# Patient Record
Sex: FEMALE | Race: WHITE | NOT HISPANIC OR LATINO | URBAN - METROPOLITAN AREA
[De-identification: names, ages, dates, MRNs, and addresses within clinical notes are randomized per-mention and may not be internally consistent; named-entity substitution may affect disease eponyms.]

---

## 2024-05-08 ENCOUNTER — INPATIENT HOSPITAL (OUTPATIENT)
Dept: URBAN - METROPOLITAN AREA HOSPITAL 98 | Facility: HOSPITAL | Age: 74
End: 2024-05-08
Payer: COMMERCIAL

## 2024-05-08 DIAGNOSIS — K92.1 MELENA: ICD-10-CM

## 2024-05-08 DIAGNOSIS — R93.3 ABNORMAL FINDINGS ON DIAGNOSTIC IMAGING OF OTHER PARTS OF DI: ICD-10-CM

## 2024-05-08 PROCEDURE — 99222 1ST HOSP IP/OBS MODERATE 55: CPT | Performed by: INTERNAL MEDICINE

## 2025-06-24 ENCOUNTER — APPOINTMENT (OUTPATIENT)
Dept: DERMATOLOGY | Facility: CLINIC | Age: 75
End: 2025-06-24
Payer: MEDICARE

## 2025-06-24 DIAGNOSIS — C21.0: Primary | ICD-10-CM

## 2025-06-24 PROCEDURE — 1159F MED LIST DOCD IN RCRD: CPT | Performed by: STUDENT IN AN ORGANIZED HEALTH CARE EDUCATION/TRAINING PROGRAM

## 2025-06-24 PROCEDURE — 99204 OFFICE O/P NEW MOD 45 MIN: CPT | Performed by: STUDENT IN AN ORGANIZED HEALTH CARE EDUCATION/TRAINING PROGRAM

## 2025-06-24 RX ORDER — SEMAGLUTIDE 0.68 MG/ML
INJECTION, SOLUTION SUBCUTANEOUS
COMMUNITY
Start: 2025-02-04

## 2025-06-24 RX ORDER — CLOPIDOGREL BISULFATE 75 MG/1
75 TABLET ORAL DAILY
COMMUNITY
Start: 2024-06-10

## 2025-06-24 RX ORDER — LANCETS
EACH MISCELLANEOUS
COMMUNITY

## 2025-06-24 RX ORDER — METHYLPREDNISOLONE 4 MG/1
TABLET ORAL
COMMUNITY
Start: 2025-05-06

## 2025-06-24 RX ORDER — ALBUTEROL SULFATE 90 UG/1
2 INHALANT RESPIRATORY (INHALATION) EVERY 4 HOURS PRN
COMMUNITY
Start: 2024-11-04

## 2025-06-24 RX ORDER — METOPROLOL SUCCINATE 200 MG/1
200 TABLET, EXTENDED RELEASE ORAL DAILY
COMMUNITY

## 2025-06-24 RX ORDER — PANTOPRAZOLE SODIUM 40 MG/1
40 TABLET, DELAYED RELEASE ORAL
COMMUNITY

## 2025-06-24 RX ORDER — ALIROCUMAB 75 MG/ML
INJECTION, SOLUTION SUBCUTANEOUS
COMMUNITY
Start: 2024-09-10

## 2025-06-24 RX ORDER — FLUTICASONE PROPIONATE 50 MCG
SPRAY, SUSPENSION (ML) NASAL
COMMUNITY

## 2025-06-24 RX ORDER — ATORVASTATIN CALCIUM 80 MG/1
80 TABLET, FILM COATED ORAL DAILY
COMMUNITY
Start: 2024-04-17

## 2025-06-24 RX ORDER — SPIRONOLACTONE 50 MG/1
50 TABLET, FILM COATED ORAL DAILY
COMMUNITY
Start: 2024-06-03

## 2025-06-24 RX ORDER — FLASH GLUCOSE SENSOR
KIT MISCELLANEOUS
COMMUNITY

## 2025-06-24 RX ORDER — LORATADINE 10 MG/1
10 TABLET ORAL
COMMUNITY

## 2025-06-24 RX ORDER — LISINOPRIL 40 MG/1
40 TABLET ORAL DAILY
COMMUNITY
Start: 2024-06-10

## 2025-06-24 RX ORDER — INSULIN LISPRO 100 [IU]/ML
INJECTION, SOLUTION INTRAVENOUS; SUBCUTANEOUS
COMMUNITY
Start: 2025-05-19

## 2025-06-24 NOTE — Clinical Note
>12 cm x 5 cm pink plaque predominantly perianal but also extending to the vulva. No inguinal lymphadenopathy.

## 2025-06-24 NOTE — PROGRESS NOTES
Office Visit Note  Date: 6/24/2025  Surgeon:  Kal Morales MD  Office Location: [facility] [place] [id number] [address] [address]  Dept: [phone]  Dept Fax: [phone]  Referring Provider: Sunshine Dawson PA-C  [facility],  [place] [id number] [place] [address]   Goldie Crystal is a 74 y.o. female who presents for the following: Consult for extramammary Paget disease (EMPD).     According to the patient, the lesion has been present for approximately two years prior to diagnosis. It is only in the buttock area and she denies rashes in her armpits or groin. The lesion was biopsied on 6/2/25 and pathology was consistent with EMPD.  The lesion is bleeding and painful.  The lesion has not been treated previously.    Regarding the patient's oncologic history, she believes she had a malignant mass removed from her left kidney and she is being monitored for another mass on her right kidney. She has had three colonoscopies in the past which required polyp removal twice. She is past due for her most recent colonoscopy 2 years ago due to a stroke. She states that when she suffered her stroke, a colonoscopy was deemed too high risk as she was on blood thinners. She currently takes Plavix. She also had a hysterectomy 27 years ago for uterine fibroids.      The patient does not have a pacemaker / defibrillator.  The patient does not have a heart valve / joint replacement.    The patient is on blood thinners.  The patient does not have a history of hepatitis B or C.  The patient does not have a history of HIV.  The patient does not have a history of immunosuppression (e.g. organ transplantation, malignancy, medications)    Review of Systems:  No fever, chills, unintentional weight loss, chronic cough, abdominal pain, bloody stool or urine.     MEDICAL HISTORY: clinically relevant history including significant past medical history, medications and allergies was reviewed and documented in  Epic.    Objective   Well appearing patient in no apparent distress; mood and affect are within normal limits.  Vital signs: See record.  Noted on the   Gluteal Cleft  >12 cm x 5 cm pink plaque predominantly perianal but also extending to the vulva. No inguinal lymphadenopathy.     The patient confirmed the identified site.    Discussion:  The nature of the diagnosis was explained. The lesion is a rare skin cancer. It was discussed with the patient that EMPD can be primary (isolated) or secondary (associated with an internal malignancy). Perinanal EMPD in particular is associated with colon cancer. The patient understands this. She is overdue for a colonoscopy (skipped last screening due to stroke) and has a history of polyps. I discussed with the patient that we will present her case at tumor board to determine recommendations for screening for internal malignancy.    Regarding treatment for the cutaneous EMPD itself, I discussed the management options including surgery, radiotherapy and topical treatments such as imiquimod. Surgical resection is first line but her disease is extensive. Unfortunately, given the proximity of her EMPD to the anus and vulva, resection will likely require involvement of colorectal surgery and gynecologic oncology. It was explained that recurrence of EMPD is high even after surgery (studies suggest 10-30%). Risks of surgery including but not limited to bleeding, infection, numbness, nerve damage, and scar were reviewed.  Discussion included wound care requirements, activity restrictions, likely scar outcome and time to heal. Non-surgical options including radiotherapy and chemotherapy are also available. We will follow up with tumor board recommendations.     -Follow up tumor board recommendations for screening and treatment  -If surgery is recommended, will likely need coordination with colorectal surgery and gynecologic oncology. Can consider scouting biopsies to assess extent of  disease.     The patient will follow up pending tumor board recommendations

## 2025-06-30 ENCOUNTER — TUMOR BOARD CONFERENCE (OUTPATIENT)
Dept: HEMATOLOGY/ONCOLOGY | Facility: HOSPITAL | Age: 75
End: 2025-06-30
Payer: MEDICARE

## 2025-06-30 DIAGNOSIS — C44.99 EXTRA-MAMMARY PAGETS DISEASE: ICD-10-CM

## 2025-06-30 NOTE — TUMOR BOARD NOTE
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    Recommendation:     Screening: Referral to GI for discussion of colonoscopy. Consider urine cytology and CT CAP, and referral to gynecologic oncology for additional testing.  Reference: Marissa N, Mario VILLALOBOS, Paul B, Brian DE LA CRUZ; Extramammary Paget's Disease Guideline Study Group. Recommended guidelines for screening for underlying malignancy in extramammary Paget's disease based on anatomic subtype. J Am Acad Dermatol. 2025 Feb;92(2):261-268. doi: 10.1016/j.jaad.2024.07.1531. Epub 2024 Oct 12. PMID: [id number].\"     Treatment: Given the proximity of her EMPD to the anus and vulva, recommend referral to colorectal surgery and gynecologic oncology for consideration of resection.      Review Multidisciplinary Cutaneous Oncology Conference recommendation with patient.  Continue routine follow up and total body skin exams with Sunshine Dawson.    Follow Up:  Sunshine Dawson, colorectal surgery, gynecologic surgery      History and Physical Exam  Dermatologic History:   74 y.o. female with a 12 X 5 cm pink plaque predominantly perianal but also extending to the vulva. This was first noticed approximately 2 years ago. She underwent  a biopsy of the gluteal cleft on 6/2/2025 showing a Extramammary Paget's disease.    PMH:    Essential hypertension, benign      GERD (gastroesophageal reflux disease)      Left kidney mass       DX 4/2019    Long term current use of insulin (HCC)      Mixed hyperlipidemia      Obesity (BMI 30.0-34.9)      Palpitations      Restless leg      Sleep apnea       with CPAP     Stroke, Ischemic- Left Parietal 06/2023    Type 2 diabetes mellitus with hyperglycemia (HCC)  "             Pathology  Results A. Gluteal Cleft, Biopsy by Shave Method CONSISTENT WITH EXTRAMAMMARY PAGET' S DISEASE COMMENT: Additional clinical work- up is recommended to determine the primary site of this carcinoma.

## 2025-07-09 ENCOUNTER — APPOINTMENT (OUTPATIENT)
Dept: SURGERY | Facility: CLINIC | Age: 75
End: 2025-07-09
Payer: MEDICARE

## 2025-07-10 ENCOUNTER — TELEPHONE (OUTPATIENT)
Dept: DERMATOLOGY | Facility: CLINIC | Age: 75
End: 2025-07-10
Payer: MEDICARE

## 2025-07-10 DIAGNOSIS — C21.0: Primary | ICD-10-CM

## 2025-07-10 RX ORDER — FAMOTIDINE 20 MG/1
TABLET, FILM COATED ORAL
Qty: 1 TABLET | Refills: 0 | Status: CANCELLED | OUTPATIENT
Start: 2025-07-10

## 2025-07-10 RX ORDER — PREDNISONE 50 MG/1
TABLET ORAL
Qty: 3 TABLET | Refills: 0 | Status: SHIPPED | OUTPATIENT
Start: 2025-07-10

## 2025-07-10 NOTE — TELEPHONE ENCOUNTER
Called patient to discuss imaging screening for internal malignancy associated with extramammary Paget disease. Because contrast with her CT scans of the chest, abdomen and pelvis would be helpful to identify internal malignancies, I discussed with her the allergy to contrast. She stated that her allergic reaction occurred when she was 23 and she developed coughing, sneezing and some wheezing when she received contrast, which improved with benadryl. She did not require an Epi pen. She also tolerated a subsequent CT with contrast in 2019 after premedication with benadryl. I discussed her case with radiology who suggested a 13 hour prep protocol including prednisone, benadryl and pepcid prior to receiving contrast. The patient was agreeable to undergoing the prep in order to get the contrast. She was verbally provided with the protocol and a prescription for prednisone was sent to her pharmacy. The patient will schedule the CT scans.

## 2025-07-21 NOTE — PROGRESS NOTES
Gynecologic Oncology Initial Consultation  [facility]    Patient ID: Goldie Crystal, 74 y.o.  Referring Physician: Kal Morales MD  [address] [place] [address]  Primary Care Provider: ARVIN Winston-CNP      Reason for Consultation: paget disease  Subjective    HPI 75 y/o F here in consultation for extramammary paget disease. She reports perianal lesion 2 years ago. Initial thought was bed sores. Was evaluated by a couple of providers. Deferred biopsy. Treatment with topical steroids with no relief. With ongoing symptoms she was referred to wound care. Subsequently was seen by dermatology. A biopsy was performed on 6/20/25 consistent with extramamammary paget disease CK20, CK7 and EMA +. She was referred for further management. Planning to see Dr Arriaga upcoming.     Report of ongoing pruritus. Worse just lateral to the perineum and surrounding the anus. Denies pain. Does report constipation with a history of gastroparesis, etiology unknown.     A comprehensive review of systems was performed and otherwise negative.    Objective      Medical History[1]     Surgical History[2]     Family History[3]  Otherwise, denies history of endometrial, ovarian, breast, prostate, or colorectal cancers.    OBGYN Hx:  - Last Pap unsure, denies history of abnormal    Screening:  - Last Mammogram: UTD, last 2025 normal  - Last Colonoscopy: Deferred, was encouraged to discuss with Dr Bart Del Cid Hx:  Goldie Crystal  has no history on file for tobacco use.  She  has no history on file for alcohol use.  She  has no history on file for drug use.    Physical Exam  Vitals and nursing note reviewed. Exam conducted with a chaperone present.   Constitutional:       General: She is not in acute distress.     Appearance: Normal appearance.   HENT:      Head: Normocephalic and atraumatic.      Mouth/Throat:      Mouth: Mucous membranes are moist.      Pharynx: No oropharyngeal exudate or posterior oropharyngeal erythema.      Eyes:      Extraocular Movements: Extraocular movements intact.      Conjunctiva/sclera: Conjunctivae normal.      Pupils: Pupils are equal, round, and reactive to light.     Neck:      Thyroid: No thyroid mass or thyromegaly.     Cardiovascular:      Rate and Rhythm: Normal rate and regular rhythm.      Pulses: Normal pulses.      Heart sounds: Normal heart sounds.   Pulmonary:      Effort: Pulmonary effort is normal.      Breath sounds: Normal breath sounds. No wheezing, rhonchi or rales.   Abdominal:      General: Bowel sounds are normal. There is no distension.      Palpations: Abdomen is soft. There is no mass.      Tenderness: There is no abdominal tenderness.      Hernia: No hernia is present.   Genitourinary:       Comments: Plaque with cupcake appearance posterior to the labia majora and lateral to the perineal body extending along the anus measuring approximate 8 x 4 cm.    Musculoskeletal:         General: No tenderness. Normal range of motion.      Cervical back: Normal range of motion and neck supple. No tenderness.      Right lower leg: No edema.      Left lower leg: No edema.     Skin:     General: Skin is warm.      Findings: No lesion or rash.           Comments: Second lesion posterior, just cephalad to the anus measuring approximately 4 x 5 cm     Neurological:      General: No focal deficit present.      Mental Status: She is alert and oriented to person, place, and time.     Psychiatric:         Mood and Affect: Mood normal.         Behavior: Behavior normal.       BSA: There is no height or weight on file to calculate BSA.  There were no vitals taken for this visit.  General:   alert and oriented, in no acute distress   Heart: regular rate and rhythm, S1, S2 normal, no murmur, click, rub or gallop   Lungs: clear to auscultation bilaterally   Abdomen: soft, non-tender, without masses or organomegaly     Pathology:  See above    Imaging:  CT 8/17/24  IMPRESSION:   No acute findings to explain  patient's symptoms.   Other stable chronic and incidental findings as above.       Performance Status:  Symptomatic; fully ambulatory    Assessment/Plan    74 y.o. with extramammary paget disease    # extramammary paget disease  - Reviewed paget disease is an intraepithelial adenocarcinoma however invasive adenocarcinomas may be present within or beneath the surface lesion in up to 25 percent and 20 to 30 percent of patients will have a noncontiguous carcinoma   - Discussed treatment typically consists of wide local excision or vulvectomy, depending on the extent of disease, though more conservative surgery with < 2 cm margins can be considered  - Can consider alternatives to surgery with either definitive RT, cytotoxic chemotherapy, and topical imiquimod though data is limited on benefit   - Counseled on risk of surgery and likely need for reconstruction with plastic surgery   - Given distribution of disease involving mostly the perianal region will discuss surgical resectability with colorectal surgery   - Discussed high risk of recurrence even with negative margins    # pruritus   - Atarax 25 TID PRN sent to the pharmacy    RTC 2-3 weeks    Mona Barboza MD MPH                      [1] No past medical history on file.  [2] No past surgical history on file.  [3] No family history on file.

## 2025-07-23 ENCOUNTER — OFFICE VISIT (OUTPATIENT)
Dept: GYNECOLOGIC ONCOLOGY | Facility: CLINIC | Age: 75
End: 2025-07-23
Payer: MEDICARE

## 2025-07-23 VITALS
SYSTOLIC BLOOD PRESSURE: 126 MMHG | HEART RATE: 79 BPM | TEMPERATURE: 97.5 F | RESPIRATION RATE: 17 BRPM | OXYGEN SATURATION: 93 % | WEIGHT: 172.4 LBS | DIASTOLIC BLOOD PRESSURE: 65 MMHG | BODY MASS INDEX: 34.76 KG/M2 | HEIGHT: 59 IN

## 2025-07-23 DIAGNOSIS — L29.9 PRURITUS: ICD-10-CM

## 2025-07-23 DIAGNOSIS — C44.99 EXTRAMAMMARY PAGET DISEASE: Primary | ICD-10-CM

## 2025-07-23 PROCEDURE — 1126F AMNT PAIN NOTED NONE PRSNT: CPT | Performed by: STUDENT IN AN ORGANIZED HEALTH CARE EDUCATION/TRAINING PROGRAM

## 2025-07-23 PROCEDURE — 99205 OFFICE O/P NEW HI 60 MIN: CPT | Performed by: STUDENT IN AN ORGANIZED HEALTH CARE EDUCATION/TRAINING PROGRAM

## 2025-07-23 PROCEDURE — 3008F BODY MASS INDEX DOCD: CPT | Performed by: STUDENT IN AN ORGANIZED HEALTH CARE EDUCATION/TRAINING PROGRAM

## 2025-07-23 PROCEDURE — 99215 OFFICE O/P EST HI 40 MIN: CPT | Performed by: STUDENT IN AN ORGANIZED HEALTH CARE EDUCATION/TRAINING PROGRAM

## 2025-07-23 ASSESSMENT — COLUMBIA-SUICIDE SEVERITY RATING SCALE - C-SSRS
6. HAVE YOU EVER DONE ANYTHING, STARTED TO DO ANYTHING, OR PREPARED TO DO ANYTHING TO END YOUR LIFE?: NO
2. HAVE YOU ACTUALLY HAD ANY THOUGHTS OF KILLING YOURSELF?: NO
1. IN THE PAST MONTH, HAVE YOU WISHED YOU WERE DEAD OR WISHED YOU COULD GO TO SLEEP AND NOT WAKE UP?: NO

## 2025-07-23 ASSESSMENT — PATIENT HEALTH QUESTIONNAIRE - PHQ9
1. LITTLE INTEREST OR PLEASURE IN DOING THINGS: NOT AT ALL
2. FEELING DOWN, DEPRESSED OR HOPELESS: NOT AT ALL
SUM OF ALL RESPONSES TO PHQ9 QUESTIONS 1 AND 2: 0

## 2025-07-23 ASSESSMENT — PAIN SCALES - GENERAL: PAINLEVEL_OUTOF10: 0-NO PAIN

## 2025-07-23 ASSESSMENT — ENCOUNTER SYMPTOMS
OCCASIONAL FEELINGS OF UNSTEADINESS: 0
LOSS OF SENSATION IN FEET: 0
DEPRESSION: 0

## 2025-07-23 NOTE — LETTER
July 24, 2025     Kal Morales MD  [address] [address]    Patient: Goldie Crystal   YOB: 1950   Date of Visit: 7/23/2025       Dear Dr. Kal Morales MD:    Thank you for referring Goldie Crystal to me for evaluation. Below are my notes for this consultation.  If you have questions, please do not hesitate to call me. I look forward to following your patient along with you.       Sincerely,     Mona Barboza MD MPH      CC: Carter Arriaga MD  ______________________________________________________________________________________      Gynecologic Oncology Initial Consultation  [place]    Patient ID: Goldie Crystal, 74 y.o.  Referring Physician: Kal Morales MD  [address] [place] [id number]  Primary Care Provider: ARVIN Winston-CNP      Reason for Consultation: paget disease  Subjective   HPI 75 y/o F here in consultation for extramammary paget disease. She reports perianal lesion 2 years ago. Initial thought was bed sores. Was evaluated by a couple of providers. Deferred biopsy. Treatment with topical steroids with no relief. With ongoing symptoms she was referred to wound care. Subsequently was seen by dermatology. A biopsy was performed on 6/20/25 consistent with extramamammary paget disease CK20, CK7 and EMA +. She was referred for further management. Planning to see Dr Arriaga upcoming.     Report of ongoing pruritus. Worse just lateral to the perineum and surrounding the anus. Denies pain. Does report constipation with a history of gastroparesis, etiology unknown.     A comprehensive review of systems was performed and otherwise negative.    Objective     Medical History[1]     Surgical History[2]     Family History[3]  Otherwise, denies history of endometrial, ovarian, breast, prostate, or colorectal cancers.    OBGYN Hx:  - Last Pap unsure, denies history of abnormal    Screening:  - Last Mammogram: UTD, last 2025 normal  - Last Colonoscopy: Deferred, was  encouraged to discuss with Dr Arriaga    Social Hx:  Goldie Crystal  has no history on file for tobacco use.  She  has no history on file for alcohol use.  She  has no history on file for drug use.    Physical Exam  Vitals and nursing note reviewed. Exam conducted with a chaperone present.   Constitutional:       General: She is not in acute distress.     Appearance: Normal appearance.   HENT:      Head: Normocephalic and atraumatic.      Mouth/Throat:      Mouth: Mucous membranes are moist.      Pharynx: No oropharyngeal exudate or posterior oropharyngeal erythema.     Eyes:      Extraocular Movements: Extraocular movements intact.      Conjunctiva/sclera: Conjunctivae normal.      Pupils: Pupils are equal, round, and reactive to light.     Neck:      Thyroid: No thyroid mass or thyromegaly.     Cardiovascular:      Rate and Rhythm: Normal rate and regular rhythm.      Pulses: Normal pulses.      Heart sounds: Normal heart sounds.   Pulmonary:      Effort: Pulmonary effort is normal.      Breath sounds: Normal breath sounds. No wheezing, rhonchi or rales.   Abdominal:      General: Bowel sounds are normal. There is no distension.      Palpations: Abdomen is soft. There is no mass.      Tenderness: There is no abdominal tenderness.      Hernia: No hernia is present.   Genitourinary:       Comments: Plaque with cupcake appearance posterior to the labia majora and lateral to the perineal body extending along the anus measuring approximate 8 x 4 cm.    Musculoskeletal:         General: No tenderness. Normal range of motion.      Cervical back: Normal range of motion and neck supple. No tenderness.      Right lower leg: No edema.      Left lower leg: No edema.     Skin:     General: Skin is warm.      Findings: No lesion or rash.           Comments: Second lesion posterior, just cephalad to the anus measuring approximately 4 x 5 cm     Neurological:      General: No focal deficit present.      Mental Status: She is alert and  oriented to person, place, and time.     Psychiatric:         Mood and Affect: Mood normal.         Behavior: Behavior normal.       BSA: There is no height or weight on file to calculate BSA.  There were no vitals taken for this visit.  General:   alert and oriented, in no acute distress   Heart: regular rate and rhythm, S1, S2 normal, no murmur, click, rub or gallop   Lungs: clear to auscultation bilaterally   Abdomen: soft, non-tender, without masses or organomegaly     Pathology:  See above    Imaging:  CT 8/17/24  IMPRESSION:   No acute findings to explain patient's symptoms.   Other stable chronic and incidental findings as above.       Performance Status:  Symptomatic; fully ambulatory    Assessment/Plan   74 y.o. with extramammary paget disease    # extramammary paget disease  - Reviewed paget disease is an intraepithelial adenocarcinoma however invasive adenocarcinomas may be present within or beneath the surface lesion in up to 25 percent and 20 to 30 percent of patients will have a noncontiguous carcinoma   - Discussed treatment typically consists of wide local excision or vulvectomy, depending on the extent of disease, though more conservative surgery with < 2 cm margins can be considered  - Can consider alternatives to surgery with either definitive RT, cytotoxic chemotherapy, and topical imiquimod though data is limited on benefit   - Counseled on risk of surgery and likely need for reconstruction with plastic surgery   - Given distribution of disease involving mostly the perianal region will discuss surgical resectability with colorectal surgery   - Discussed high risk of recurrence even with negative margins    # pruritus   - Atarax 25 TID PRN sent to the pharmacy    RTC 2-3 weeks    Mona Barboza MD MPH                      [1]  No past medical history on file.  [2]  No past surgical history on file.  [3]  No family history on file.       [1]  No past medical history on file.  [2]  No past surgical  history on file.  [3]  No family history on file.       [1]  No past medical history on file.  [2]  No past surgical history on file.  [3]  No family history on file.

## 2025-07-24 DIAGNOSIS — Z15.89 BIALLELIC MUTATION OF C21ORF59 GENE: Primary | ICD-10-CM

## 2025-07-24 RX ORDER — HYDROXYZINE HYDROCHLORIDE 10 MG/1
25 TABLET, FILM COATED ORAL EVERY 8 HOURS PRN
Qty: 30 TABLET | Refills: 0 | Status: SHIPPED | OUTPATIENT
Start: 2025-07-24 | End: 2025-08-03

## 2025-07-25 ENCOUNTER — HOSPITAL ENCOUNTER (OUTPATIENT)
Dept: RADIOLOGY | Facility: HOSPITAL | Age: 75
Discharge: HOME | End: 2025-07-25
Payer: MEDICARE

## 2025-07-25 DIAGNOSIS — C21.0: ICD-10-CM

## 2025-07-25 DIAGNOSIS — Z15.89 BIALLELIC MUTATION OF C21ORF59 GENE: ICD-10-CM

## 2025-07-25 LAB
CREAT SERPL-MCNC: 0.96 MG/DL (ref 0.6–1)
EGFRCR SERPLBLD CKD-EPI 2021: 62 ML/MIN/1.73M2

## 2025-07-25 PROCEDURE — 71260 CT THORAX DX C+: CPT

## 2025-07-25 PROCEDURE — 2550000001 HC RX 255 CONTRASTS: Performed by: STUDENT IN AN ORGANIZED HEALTH CARE EDUCATION/TRAINING PROGRAM

## 2025-07-25 RX ADMIN — IOHEXOL 75 ML: 350 INJECTION, SOLUTION INTRAVENOUS at 14:23

## 2025-08-04 ENCOUNTER — TELEPHONE (OUTPATIENT)
Dept: DERMATOLOGY | Facility: CLINIC | Age: 75
End: 2025-08-04

## 2025-08-04 ENCOUNTER — TUMOR BOARD CONFERENCE (OUTPATIENT)
Dept: HEMATOLOGY/ONCOLOGY | Facility: HOSPITAL | Age: 75
End: 2025-08-04
Payer: MEDICARE

## 2025-08-04 DIAGNOSIS — C21.0: Primary | ICD-10-CM

## 2025-08-04 NOTE — TUMOR BOARD NOTE
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    S/p CT CAP revealing indeterminate renal lesions given single-phase technique. Additionally, there is a indeterminate 1.3 cm right adrenal lesion.    Referred to TB for imaging review.    Recommendation: Consider kidney specific MRI. Follow-up with colorectal surgery to discuss surgical resectability.     Review Multidisciplinary Cutaneous Oncology Conference recommendation with patient.  Continue routine follow up and total body skin exams with Sunshine Dawson.    Follow Up:  Sunshine Dawson, colorectal surgery, gynecologic surgery      History and Physical Exam  Dermatologic History:   74 y.o. female with a 12 X 5 cm pink plaque predominantly perianal but also extending to the vulva. This was first noticed approximately 2 years ago. She underwent  a biopsy of the gluteal cleft on 6/2/2025 showing a Extramammary Paget's disease.    Seen by gynecologic oncology and discussed treatment options.    S/p CT CAP on 7/25/2025 revealing indeterminate renal lesions given single-phase technique. Additionally, there is a indeterminate 1.3 cm right adrenal lesion.    PMH:    Essential hypertension, benign      GERD (gastroesophageal reflux disease)      Left kidney mass       DX 4/2019    Long term current use of insulin (HCC)      Mixed hyperlipidemia      Obesity (BMI 30.0-34.9)      Palpitations      Restless leg      Sleep apnea       with CPAP     Stroke, Ischemic- Left Parietal 06/2023    Type 2 diabetes mellitus with hyperglycemia (HCC)              Pathology  Results A. Gluteal Cleft, Biopsy by Shave Method CONSISTENT WITH EXTRAMAMMARY PAGET' S DISEASE COMMENT: Additional clinical work- up is  recommended to determine the primary site of this carcinoma.    Radiology  CT CHEST ABDOMEN PELVIS W IV CONTRAST;    7/25/2025 2:28 pm      INDICATION:  Signs/Symptoms:Patient with extramammary Paget disease, please  evaluate for internal malignancy (gautam. colon adenocarcinoma). Patient  will undergo 13 hour prep given previous allergy.      COMPARISON:  None.      ACCESSION NUMBER(S):  [id number]      ORDERING CLINICIAN:  VALDO SZYMANSKI      TECHNIQUE:  Contiguous axial images of the chest, abdomen, and pelvis were  obtained after the intravenous administration of 75 mL Omnipaque 350  contrast. Coronal and sagittal reformatted images were reconstructed  from the axial data.      FINDINGS:  CT CHEST:      MEDIASTINUM AND LYMPH NODES:  No enlarged intrathoracic or axillary  lymph nodes by imaging criteria. The thyroid gland is unremarkable.  The esophagus appears within normal limits. No pneumomediastinum.      VESSELS: Normal caliber of the pulmonary vessels without large  proximal pulmonary emboli within limitations of non angiographic  study. Normal caliber aorta without dissection. Mild aortic  atherosclerosis.      HEART: Normal size. Prominent aortic annulus calcifications noted.  Mild coronary artery calcifications. No significant pericardial  effusion.      LUNG, AIRWAYS, PLEURA: Large airways are patent.  Right-greater-than-left basilar scarring noted. Prominent pericardial  fat pad results in mild volume loss and component of scarring within  the lingula and right middle lobe. No focal consolidation, pleural  effusion, pneumothorax. No suspicious discrete nodularity identified.      CHEST WALL SOFT TISSUES: No discernible acute abnormality.      OSSEOUS STRUCTURES: No acute osseous abnormality.          CT ABDOMEN/PELVIS:      ABDOMINAL WALL: Postsurgical changes of the anterior abdominal wall  noted. Fat stranding small amount of skin thickening is noted  overlying the anterior abdominal wall (series 2, image  148) which may  relate to patient's known history of extramedullary Paget's disease.  There is a small fat containing ventral hernia within ostium of 2 cm  (series 2, image 144).      LIVER: No significant parenchymal abnormality.      BILE DUCTS: No significant intrahepatic or extrahepatic dilatation.      GALLBLADDER: No significant abnormality.      PANCREAS: No significant abnormality.      SPLEEN: No significant abnormality.      ADRENALS: A 1.3 cm hypodense right adrenal lesion is present. The  adrenals are otherwise unremarkable.      KIDNEYS, URETERS, BLADDER: Lobulated contour of the bilateral  kidneys. Multifocal cortically based lesions noted most of which are  low internal density most likely representing simple renal cysts.  However, within the superior pole left kidney, there is a 1.5 cm  cortically based hyperdense rounded lesion which is high in internal  density with surrounding prominent vasculature (series 2, image 96  and 98). Additional indeterminate density cortically based lesion  noted within the superior pole left kidney (series 2, image 74) which  measures 1.0 cm. The bladder is unremarkable.      REPRODUCTIVE ORGANS: Foci of irregular calcification noted within the  right ovary which measures 2.2 x 1.5 cm (series 2, image 144). The  left ovary is unremarkable. No adnexal mass is evident.      VESSELS: Moderate calcification of the abdominal aorta. The IVC and  portal veins are patent. 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      RETROPERITONEUM/LYMPH NODES: No mesenteric or retroperitoneal  lymphadenopathy by CT size criteria. No acute retroperitoneal  abnormality.      BOWEL/MESENTERY/PERITONEUM: The stomach is unremarkable. There are  multiple small duodenal diverticula (series 2, image 105). Otherwise,  the small bowel is of normal caliber without significant bowel wall  thickening or dilation. Significant diverticulosis of the  descending  and sigmoid colon noted with mild thickening of the rectosigmoid  colon likely sequela of chronic inflammation. No masslike thickening  identified. No evidence to suggest acute diverticulitis. The appendix  is not definitively visualized.      No ascites, free air, or fluid collection.          MUSCULOSKELETAL: No suspicious osseous lesion identified. Compression  deformities identified of L1 which is of indeterminate chronicity  although, favored to be chronic in nature given lack of surrounding  inflammatory changes. There is accentuation of the thoracic kyphosis  and lumbar lordosis secondary to degenerative changes. There is grade  1 anterolisthesis of L4 on L5 and grade 1 retrolisthesis of L1 on L2.      IMPRESSION:  1. Indeterminate renal lesions given single-phase technique.  Additionally, there is a indeterminate 1.3 cm right adrenal lesion.  Further characterization of these findings by multiphase renal MRI  with and without contrast is recommended.  2. Additional chronic and incidental findings as described above.

## 2025-08-04 NOTE — TELEPHONE ENCOUNTER
Patient called and left a voicemail stating she has been in contact with Dr Morales regarding potential upcoming treatment but has not yet had surgery yet.  Today she is having flank pain and is concerned she may have a kidney stone; she wanted to know if she should be seen at a  facility for evaluation and that there is not a urologist close to where she lives.  Attempted to reach patient; voice mailbox is full and there was no answer at the home phone number.  Called patient's daughter and left a voicemail (per comm form) explaining the situation and letting her know her mother should be evaluated for her pain wherever she is able to be seen and that Dr Morales would not be involved for this issue but for her dermatology care only.  Dr Morales aware of the voicemail left by the patient.

## 2025-08-05 ENCOUNTER — SOCIAL WORK (OUTPATIENT)
Dept: HEMATOLOGY/ONCOLOGY | Facility: CLINIC | Age: 75
End: 2025-08-05
Payer: MEDICARE

## 2025-08-05 NOTE — PROGRESS NOTES
Patient was recently seen as a new patient with Dr. Barboza- for consultation of paget disease. Patient identified a distress score of 6 at the visit with contributing factors as : physical concerns (fatigue, memory or concentration); emotional concerns (sadness or depression, loneliness); social concerns (relationship with children); practical concerns (finances)- LSW attempted to reach patient but she did not answer and voice mail box was unable to accept messages at this time.  LSW will attempt at later time. The patient was provided with the below letter along with information on how a  can help, patient was also provided with my contact information. No current social work needs have been identified at the moment but social work remains available should any psychosocial needs arise.      July 31, 2025    Dear Goldie,      I hope this letter finds you well. As part of [facility]'s commitment to providing comprehensive care, I wanted to introduce myself as the social work on your care team. Our team goal is to support you and your loved ones throughout your care here, addressing not only your medical needs but also your emotional, practical, and social concerns.    My name is Estephanie Montilla and I am a licensed social worker working within Dr. Barboza's office.  I am available to assist you in a variety of ways, including:  Emotional Support: Offering a safe space to discuss your feelings and coping strategies.  Resource Navigation: Connecting you with financial assistance programs, local transportation resources, and other community resources.  Family Support: Helping your loved ones understand and navigate the challenges of your diagnosis.  Advanced Care Planning: Assisting with decisions about care preferences and advance directives.    These services are provided at no additional cost to you as part of your care at [facility]. I  encourage you to reach out and take advantage of this support whenever needed.    If you have questions or if you would like to set up a time to talk, please feel free to reach out to me at [phone] or email me at [email or web address]    Sincerely,      BRIAN Mendoza, [facility]  Oncology Social Work  [facility]  [phone]

## 2025-08-08 ENCOUNTER — TELEPHONE (OUTPATIENT)
Dept: DERMATOLOGY | Facility: CLINIC | Age: 75
End: 2025-08-08
Payer: MEDICARE

## 2025-08-08 NOTE — TELEPHONE ENCOUNTER
Patient called and left voicemail regarding her urologist wanting to order an MRI and wanting to know if Dr Morales was already ordering an MRI prior to her surgical procedure, that her urologist said Dr Morales's order for the MRI would be fine if so.      Called patient back to discuss per Dr Morales; voice mailbox full so unable to leave a message.  No answering machine to leave message on house phone number and pt had already left work for the day.  Dr Morales aware and will reach out to patient himself.

## 2025-08-13 ENCOUNTER — OFFICE VISIT (OUTPATIENT)
Dept: SURGERY | Facility: CLINIC | Age: 75
End: 2025-08-13
Payer: MEDICARE

## 2025-08-13 VITALS
HEART RATE: 82 BPM | TEMPERATURE: 98.1 F | SYSTOLIC BLOOD PRESSURE: 115 MMHG | DIASTOLIC BLOOD PRESSURE: 74 MMHG | BODY MASS INDEX: 35.48 KG/M2 | HEIGHT: 59 IN | WEIGHT: 176 LBS

## 2025-08-13 DIAGNOSIS — C44.99 EXTRA-MAMMARY PAGETS DISEASE: ICD-10-CM

## 2025-08-13 PROCEDURE — 99205 OFFICE O/P NEW HI 60 MIN: CPT | Performed by: STUDENT IN AN ORGANIZED HEALTH CARE EDUCATION/TRAINING PROGRAM

## 2025-08-13 PROCEDURE — 46600 DIAGNOSTIC ANOSCOPY SPX: CPT | Performed by: STUDENT IN AN ORGANIZED HEALTH CARE EDUCATION/TRAINING PROGRAM

## 2025-08-13 PROCEDURE — 1159F MED LIST DOCD IN RCRD: CPT | Performed by: STUDENT IN AN ORGANIZED HEALTH CARE EDUCATION/TRAINING PROGRAM

## 2025-08-13 PROCEDURE — 99215 OFFICE O/P EST HI 40 MIN: CPT | Mod: 25 | Performed by: STUDENT IN AN ORGANIZED HEALTH CARE EDUCATION/TRAINING PROGRAM

## 2025-08-13 PROCEDURE — 1036F TOBACCO NON-USER: CPT | Performed by: STUDENT IN AN ORGANIZED HEALTH CARE EDUCATION/TRAINING PROGRAM

## 2025-08-13 PROCEDURE — 3008F BODY MASS INDEX DOCD: CPT | Performed by: STUDENT IN AN ORGANIZED HEALTH CARE EDUCATION/TRAINING PROGRAM

## 2025-08-13 RX ORDER — CEPHALEXIN 500 MG/1
500 CAPSULE ORAL 4 TIMES DAILY
COMMUNITY

## 2025-08-13 ASSESSMENT — ENCOUNTER SYMPTOMS
ANAL BLEEDING: 0
DIARRHEA: 0
LIGHT-HEADEDNESS: 0
SEIZURES: 0
CONSTIPATION: 1
DYSURIA: 0
FATIGUE: 0
UNEXPECTED WEIGHT CHANGE: 1
DIZZINESS: 0
NAUSEA: 0
DIAPHORESIS: 0
HEMATOLOGIC/LYMPHATIC NEGATIVE: 1
FEVER: 0
APPETITE CHANGE: 0
DIFFICULTY URINATING: 0
VOMITING: 0
ABDOMINAL PAIN: 0
RECTAL PAIN: 0
SHORTNESS OF BREATH: 0
CHEST TIGHTNESS: 0
BLOOD IN STOOL: 0
CHILLS: 0
ABDOMINAL DISTENTION: 1
PALPITATIONS: 0
HEMATURIA: 0
ACTIVITY CHANGE: 0

## 2025-08-14 ENCOUNTER — TELEMEDICINE (OUTPATIENT)
Dept: GYNECOLOGIC ONCOLOGY | Facility: HOSPITAL | Age: 75
End: 2025-08-14
Payer: MEDICARE

## 2025-08-14 DIAGNOSIS — C44.99 EXTRAMAMMARY PAGET DISEASE: Primary | ICD-10-CM

## 2025-08-14 PROCEDURE — 99214 OFFICE O/P EST MOD 30 MIN: CPT | Performed by: STUDENT IN AN ORGANIZED HEALTH CARE EDUCATION/TRAINING PROGRAM

## 2025-08-15 ENCOUNTER — TELEPHONE (OUTPATIENT)
Dept: PLASTIC SURGERY | Facility: HOSPITAL | Age: 75
End: 2025-08-15
Payer: MEDICARE

## 2025-08-19 ENCOUNTER — TELEPHONE (OUTPATIENT)
Dept: DERMATOLOGY | Facility: CLINIC | Age: 75
End: 2025-08-19
Payer: MEDICARE

## 2025-08-19 DIAGNOSIS — D41.02 NEOPLASM OF UNCERTAIN BEHAVIOR OF LEFT KIDNEY: Primary | ICD-10-CM

## 2025-08-27 ENCOUNTER — HOSPITAL ENCOUNTER (OUTPATIENT)
Dept: GASTROENTEROLOGY | Facility: HOSPITAL | Age: 75
Discharge: HOME | End: 2025-08-27
Payer: MEDICARE

## 2025-08-27 ENCOUNTER — ANESTHESIA (OUTPATIENT)
Dept: GASTROENTEROLOGY | Facility: HOSPITAL | Age: 75
End: 2025-08-27
Payer: MEDICARE

## 2025-08-27 ENCOUNTER — ANESTHESIA EVENT (OUTPATIENT)
Dept: GASTROENTEROLOGY | Facility: HOSPITAL | Age: 75
End: 2025-08-27
Payer: MEDICARE

## 2025-08-27 VITALS
HEART RATE: 84 BPM | TEMPERATURE: 97.2 F | RESPIRATION RATE: 16 BRPM | DIASTOLIC BLOOD PRESSURE: 78 MMHG | BODY MASS INDEX: 34.68 KG/M2 | SYSTOLIC BLOOD PRESSURE: 152 MMHG | OXYGEN SATURATION: 98 % | HEIGHT: 59 IN | WEIGHT: 172 LBS

## 2025-08-27 DIAGNOSIS — C44.99 EXTRA-MAMMARY PAGETS DISEASE: ICD-10-CM

## 2025-08-27 LAB — GLUCOSE BLD MANUAL STRIP-MCNC: 61 MG/DL (ref 74–99)

## 2025-08-27 PROCEDURE — 7100000009 HC PHASE TWO TIME - INITIAL BASE CHARGE

## 2025-08-27 PROCEDURE — 99221 1ST HOSP IP/OBS SF/LOW 40: CPT | Performed by: SURGERY

## 2025-08-27 PROCEDURE — 99100 ANES PT EXTEME AGE<1 YR&>70: CPT | Performed by: ANESTHESIOLOGY

## 2025-08-27 PROCEDURE — 3700000001 HC GENERAL ANESTHESIA TIME - INITIAL BASE CHARGE

## 2025-08-27 PROCEDURE — 7100000010 HC PHASE TWO TIME - EACH INCREMENTAL 1 MINUTE

## 2025-08-27 PROCEDURE — A45380 PR COLONOSCOPY,BIOPSY: Performed by: NURSE ANESTHETIST, CERTIFIED REGISTERED

## 2025-08-27 PROCEDURE — 2500000004 HC RX 250 GENERAL PHARMACY W/ HCPCS (ALT 636 FOR OP/ED): Performed by: NURSE ANESTHETIST, CERTIFIED REGISTERED

## 2025-08-27 PROCEDURE — 45380 COLONOSCOPY AND BIOPSY: CPT | Performed by: SURGERY

## 2025-08-27 PROCEDURE — A45380 PR COLONOSCOPY,BIOPSY: Performed by: ANESTHESIOLOGY

## 2025-08-27 PROCEDURE — 82947 ASSAY GLUCOSE BLOOD QUANT: CPT

## 2025-08-27 PROCEDURE — 3700000002 HC GENERAL ANESTHESIA TIME - EACH INCREMENTAL 1 MINUTE

## 2025-08-27 RX ORDER — GLYCOPYRROLATE 0.2 MG/ML
INJECTION INTRAMUSCULAR; INTRAVENOUS AS NEEDED
Status: DISCONTINUED | OUTPATIENT
Start: 2025-08-27 | End: 2025-08-27

## 2025-08-27 RX ORDER — PROPOFOL 10 MG/ML
INJECTION, EMULSION INTRAVENOUS AS NEEDED
Status: DISCONTINUED | OUTPATIENT
Start: 2025-08-27 | End: 2025-08-27

## 2025-08-27 RX ORDER — LIDOCAINE HYDROCHLORIDE 20 MG/ML
INJECTION, SOLUTION INFILTRATION; PERINEURAL AS NEEDED
Status: DISCONTINUED | OUTPATIENT
Start: 2025-08-27 | End: 2025-08-27

## 2025-08-27 RX ADMIN — PROPOFOL 50 MG: 10 INJECTION, EMULSION INTRAVENOUS at 13:20

## 2025-08-27 RX ADMIN — GLYCOPYRROLATE 0.1 MG: 0.2 INJECTION, SOLUTION INTRAMUSCULAR; INTRAVENOUS at 13:36

## 2025-08-27 RX ADMIN — PROPOFOL 50 MG: 10 INJECTION, EMULSION INTRAVENOUS at 13:29

## 2025-08-27 RX ADMIN — LIDOCAINE HYDROCHLORIDE 40 MG: 20 INJECTION, SOLUTION INFILTRATION; PERINEURAL at 13:20

## 2025-08-27 RX ADMIN — PROPOFOL 100 MCG/KG/MIN: 10 INJECTION, EMULSION INTRAVENOUS at 13:21

## 2025-08-27 RX ADMIN — PROPOFOL 40 MG: 10 INJECTION, EMULSION INTRAVENOUS at 13:55

## 2025-08-27 RX ADMIN — SODIUM CHLORIDE, POTASSIUM CHLORIDE, SODIUM LACTATE AND CALCIUM CHLORIDE: 600; 310; 30; 20 INJECTION, SOLUTION INTRAVENOUS at 13:13

## 2025-08-27 SDOH — HEALTH STABILITY: MENTAL HEALTH: CURRENT SMOKER: 0

## 2025-08-27 ASSESSMENT — ENCOUNTER SYMPTOMS
CONSTITUTIONAL NEGATIVE: 1
HEMATOLOGIC/LYMPHATIC NEGATIVE: 1
MUSCULOSKELETAL NEGATIVE: 1
RESPIRATORY NEGATIVE: 1
CARDIOVASCULAR NEGATIVE: 1
ENDOCRINE NEGATIVE: 1
PSYCHIATRIC NEGATIVE: 1
NEUROLOGICAL NEGATIVE: 1
GASTROINTESTINAL NEGATIVE: 1
ALLERGIC/IMMUNOLOGIC NEGATIVE: 1

## 2025-08-27 ASSESSMENT — PAIN SCALES - GENERAL
PAINLEVEL_OUTOF10: 0 - NO PAIN

## 2025-08-27 ASSESSMENT — PAIN - FUNCTIONAL ASSESSMENT
PAIN_FUNCTIONAL_ASSESSMENT: 0-10

## 2025-08-28 ENCOUNTER — TELEPHONE (OUTPATIENT)
Dept: GYNECOLOGIC ONCOLOGY | Facility: HOSPITAL | Age: 75
End: 2025-08-28
Payer: MEDICARE

## 2025-09-03 LAB
LABORATORY COMMENT REPORT: NORMAL
PATH REPORT.FINAL DX SPEC: NORMAL
PATH REPORT.GROSS SPEC: NORMAL
PATH REPORT.MICROSCOPIC SPEC OTHER STN: NORMAL
PATH REPORT.RELEVANT HX SPEC: NORMAL
PATH REPORT.TOTAL CANCER: NORMAL